# Patient Record
Sex: FEMALE | Race: OTHER | HISPANIC OR LATINO | URBAN - METROPOLITAN AREA
[De-identification: names, ages, dates, MRNs, and addresses within clinical notes are randomized per-mention and may not be internally consistent; named-entity substitution may affect disease eponyms.]

---

## 2017-08-04 ENCOUNTER — APPOINTMENT (OUTPATIENT)
Age: 27
Setting detail: DERMATOLOGY
End: 2017-08-08

## 2017-08-04 DIAGNOSIS — L70.0 ACNE VULGARIS: ICD-10-CM

## 2017-08-04 DIAGNOSIS — L81.0 POSTINFLAMMATORY HYPERPIGMENTATION: ICD-10-CM

## 2017-08-04 PROCEDURE — OTHER TREATMENT REGIMEN: OTHER

## 2017-08-04 PROCEDURE — 99202 OFFICE O/P NEW SF 15 MIN: CPT

## 2017-08-04 PROCEDURE — OTHER PRESCRIPTION: OTHER

## 2017-08-04 PROCEDURE — OTHER OTHER: OTHER

## 2017-08-04 PROCEDURE — OTHER COUNSELING: OTHER

## 2017-08-04 RX ORDER — SPIRONOLACTONE 50 MG/1
TABLET, FILM COATED ORAL
Qty: 60 | Refills: 2 | Status: ERX | COMMUNITY
Start: 2017-08-04

## 2017-08-04 RX ORDER — ADAPALENE AND BENZOYL PEROXIDE 3; 25 MG/G; MG/G
GEL TOPICAL
Qty: 1 | Refills: 5 | Status: ERX | COMMUNITY
Start: 2017-08-04

## 2017-08-04 ASSESSMENT — LOCATION SIMPLE DESCRIPTION DERM
LOCATION SIMPLE: CHEST
LOCATION SIMPLE: LEFT CHEEK
LOCATION SIMPLE: RIGHT CHEEK

## 2017-08-04 ASSESSMENT — LOCATION DETAILED DESCRIPTION DERM
LOCATION DETAILED: RIGHT INFERIOR CENTRAL MALAR CHEEK
LOCATION DETAILED: LEFT INFERIOR LATERAL MALAR CHEEK
LOCATION DETAILED: RIGHT INFERIOR MEDIAL MALAR CHEEK
LOCATION DETAILED: LEFT MEDIAL SUPERIOR CHEST
LOCATION DETAILED: LEFT CENTRAL MALAR CHEEK

## 2017-08-04 ASSESSMENT — LOCATION ZONE DERM
LOCATION ZONE: TRUNK
LOCATION ZONE: FACE

## 2017-08-04 NOTE — HPI: PIMPLES (ACNE)
How Severe Is Your Acne?: mild
Is This A New Presentation, Or A Follow-Up?: Acne
Additional Comments (Use Complete Sentences): Pt took a steroid inhaler x2 1/2. Pt stopped April 2016  Pt also noted that her birth control was lowered when this happened

## 2017-08-04 NOTE — PROCEDURE: OTHER
Detail Level: Zone
Other (Free Text): pt on OCP\\nstart spironolactone 50 mg qhs x 1 month.  increase to 50 mg BID if necessary
Note Text (......Xxx Chief Complaint.): This diagnosis correlates with the

## 2017-08-04 NOTE — PROCEDURE: TREATMENT REGIMEN
Plan: Advised to wait to take Spironolactone after colonoscopy procedure
Detail Level: Simple
Samples Given: DFW info given. Epiduo forte- pea size amount to face at bedtime

## 2017-11-03 ENCOUNTER — APPOINTMENT (OUTPATIENT)
Age: 27
Setting detail: DERMATOLOGY
End: 2017-11-04

## 2017-11-03 DIAGNOSIS — L81.0 POSTINFLAMMATORY HYPERPIGMENTATION: ICD-10-CM

## 2017-11-03 DIAGNOSIS — L70.0 ACNE VULGARIS: ICD-10-CM

## 2017-11-03 PROCEDURE — OTHER PRESCRIPTION: OTHER

## 2017-11-03 PROCEDURE — 99213 OFFICE O/P EST LOW 20 MIN: CPT

## 2017-11-03 PROCEDURE — OTHER OTHER: OTHER

## 2017-11-03 PROCEDURE — OTHER TREATMENT REGIMEN: OTHER

## 2017-11-03 PROCEDURE — OTHER COUNSELING: OTHER

## 2017-11-03 RX ORDER — ADAPALENE AND BENZOYL PEROXIDE 3; 25 MG/G; MG/G
GEL TOPICAL
Qty: 1 | Refills: 5 | Status: ERX

## 2017-11-03 ASSESSMENT — LOCATION DETAILED DESCRIPTION DERM
LOCATION DETAILED: LEFT CENTRAL MALAR CHEEK
LOCATION DETAILED: RIGHT INFERIOR MEDIAL MALAR CHEEK
LOCATION DETAILED: RIGHT INFERIOR CENTRAL MALAR CHEEK
LOCATION DETAILED: LEFT INFERIOR LATERAL MALAR CHEEK

## 2017-11-03 ASSESSMENT — LOCATION SIMPLE DESCRIPTION DERM
LOCATION SIMPLE: LEFT CHEEK
LOCATION SIMPLE: RIGHT CHEEK

## 2017-11-03 ASSESSMENT — LOCATION ZONE DERM: LOCATION ZONE: FACE

## 2017-11-03 NOTE — PROCEDURE: OTHER
Other (Free Text): better with epiduo, but still getting inflammatory papules\\npt never took spironolactone bc she does not wish to take oral pills\\n\\nshe asked about her PIPA, we discussed HQ cream\\nhowever i did tell her since she is still getting inflammatory acne that she will continue to get these PIPA spots.  we discussed that oral spironolactone might prevent the acne from occuring and thus prevent the PIPA from happening
Note Text (......Xxx Chief Complaint.): This diagnosis correlates with the
Detail Level: Detailed

## 2017-11-03 NOTE — PROCEDURE: TREATMENT REGIMEN
Detail Level: Simple
Continue Regimen: Epiduo forte- apply pea size a,punt to face at bedtime. [facility] pharmacy info given to PT